# Patient Record
Sex: FEMALE | Employment: FULL TIME | URBAN - METROPOLITAN AREA
[De-identification: names, ages, dates, MRNs, and addresses within clinical notes are randomized per-mention and may not be internally consistent; named-entity substitution may affect disease eponyms.]

---

## 2023-10-07 ENCOUNTER — OFFICE VISIT (OUTPATIENT)
Dept: URGENT CARE | Facility: URGENT CARE | Age: 38
End: 2023-10-07
Payer: COMMERCIAL

## 2023-10-07 VITALS
HEART RATE: 84 BPM | OXYGEN SATURATION: 98 % | WEIGHT: 177.2 LBS | TEMPERATURE: 97.5 F | SYSTOLIC BLOOD PRESSURE: 107 MMHG | RESPIRATION RATE: 20 BRPM | DIASTOLIC BLOOD PRESSURE: 72 MMHG

## 2023-10-07 DIAGNOSIS — J01.90 ACUTE SINUSITIS WITH COEXISTING CONDITION REQUIRING PROPHYLACTIC TREATMENT: Primary | ICD-10-CM

## 2023-10-07 DIAGNOSIS — H10.023 PINK EYE DISEASE OF BOTH EYES: ICD-10-CM

## 2023-10-07 DIAGNOSIS — J30.2 SEASONAL ALLERGIC RHINITIS, UNSPECIFIED TRIGGER: ICD-10-CM

## 2023-10-07 DIAGNOSIS — Z78.9 BREASTFEEDING (INFANT): ICD-10-CM

## 2023-10-07 PROCEDURE — 99203 OFFICE O/P NEW LOW 30 MIN: CPT | Performed by: PHYSICIAN ASSISTANT

## 2023-10-07 RX ORDER — POLYMYXIN B SULFATE AND TRIMETHOPRIM 1; 10000 MG/ML; [USP'U]/ML
1 SOLUTION OPHTHALMIC EVERY 6 HOURS
Qty: 2 ML | Refills: 0 | Status: SHIPPED | OUTPATIENT
Start: 2023-10-07 | End: 2023-10-14

## 2023-10-07 RX ORDER — ACETAMINOPHEN AND CODEINE PHOSPHATE 120; 12 MG/5ML; MG/5ML
0.35 SOLUTION ORAL
COMMUNITY
Start: 2022-12-04 | End: 2023-12-04

## 2023-10-07 RX ORDER — AMOXICILLIN 875 MG
875 TABLET ORAL 2 TIMES DAILY
Qty: 20 TABLET | Refills: 0 | Status: SHIPPED | OUTPATIENT
Start: 2023-10-07 | End: 2023-10-17

## 2023-10-07 NOTE — PROGRESS NOTES
Chief Complaint   Patient presents with    Sinus Problem     X1 wk; eye redness, runny nose, cough improving; face has lots of pressure                     ASSESSMENT:     ICD-10-CM    1. Acute sinusitis with coexisting condition requiring prophylactic treatment  J01.90 amoxicillin (AMOXIL) 875 MG tablet     trimethoprim-polymyxin b (POLYTRIM) 38516-0.1 UNIT/ML-% ophthalmic solution      2. Pink eye disease of both eyes  H10.023 amoxicillin (AMOXIL) 875 MG tablet     trimethoprim-polymyxin b (POLYTRIM) 14673-0.1 UNIT/ML-% ophthalmic solution      3. Seasonal allergic rhinitis, unspecified trigger  J30.2 amoxicillin (AMOXIL) 875 MG tablet     trimethoprim-polymyxin b (POLYTRIM) 08180-7.1 UNIT/ML-% ophthalmic solution            PLAN: Sinusitis.  Amoxicillin.  Probably trim ophthalmic for pinkeye.  Contagious x24 hours.  Recheck 3 days if not any better in regard to her eyes.  I have discussed clinical findings with patient.  Side effects of medications discussed.  Symptomatic care is discussed.  I have discussed the possibility of  worsening symptoms and indication to RTC or go to the ER if they occur.  All questions are answered, patient indicates understanding of these issues and is in agreement with plan.   Patient care instructions are discussed/given at the end of visit.   Lots of rest and fluids.      Mei Mondragon PA-C      SUBJECTIVE:  38-year-old female presents for sinus pressure and pain for at least a week.  History of seasonal allergies disease for which she takes Zyrtec and occasional Flonase.  Breast-feeding.  Also with red eyes with very minimal exudate.  No photophobia.  Does not wear contacts or glasses.  No fever.  Coughing.  No sore throat.      No Known Allergies    No past medical history on file.    norethindrone (MICRONOR) 0.35 MG tablet, Take 0.35 mg by mouth  sertraline (ZOLOFT) 50 MG tablet, Take 50 mg by mouth daily  Probiotic Product (PROBIOTIC-10 PO), Take 1 tablet by mouth  daily    No current facility-administered medications on file prior to visit.      Social History     Tobacco Use    Smoking status: Never    Smokeless tobacco: Never   Substance Use Topics    Alcohol use: Not on file       ROS:  CONSTITUTIONAL: Negative for fatigue or fever.  EYES: Negative for eye problems.  ENT: As above.  RESP: As above.  CV: Negative for chest pains.  GI: Negative for vomiting.  MUSCULOSKELETAL:  Negative for significant muscle or joint pains.  NEUROLOGIC: Negative for headaches.  SKIN: Negative for rash.  PSYCH: Normal mentation for age.    OBJECTIVE:  /72   Pulse 84   Temp 97.5  F (36.4  C) (Tympanic)   Resp 20   Wt 80.4 kg (177 lb 3.2 oz)   SpO2 98%   Breastfeeding Yes   GENERAL APPEARANCE: Healthy, alert and no distress.  EYES:Conjunctiva/sclera with mild to moderate injection.  Pupils equal reactive to light and accommodation.  EOMs intact.    EARS: No cerumen.   Ear canals w/o erythema.  TM's intact w/o erythema.    NOSE/MOUTH: Nose without ulcers, erythema or lesions.  SINUSES: Bilateral moderate  maxillary sinus tenderness.  THROAT: No erythema w/o tonsillar enlargement . No exudates.  NECK: Supple, nontender, no lymphadenopathy.  RESP: Lungs clear to auscultation - no rales, rhonchi or wheezes  CV: Regular rate and rhythm, normal S1 S2, no murmur noted.  NEURO: Awake, alert    SKIN: No rashes        Mei Mondragon PA-C